# Patient Record
Sex: FEMALE | Race: AMERICAN INDIAN OR ALASKA NATIVE
[De-identification: names, ages, dates, MRNs, and addresses within clinical notes are randomized per-mention and may not be internally consistent; named-entity substitution may affect disease eponyms.]

---

## 2019-09-07 ENCOUNTER — HOSPITAL ENCOUNTER (EMERGENCY)
Dept: HOSPITAL 5 - ED | Age: 24
LOS: 1 days | Discharge: HOME | End: 2019-09-08
Payer: SELF-PAY

## 2019-09-07 DIAGNOSIS — N61.0: Primary | ICD-10-CM

## 2019-09-07 DIAGNOSIS — Z86.2: ICD-10-CM

## 2019-09-07 DIAGNOSIS — Z79.899: ICD-10-CM

## 2019-09-07 LAB
BASOPHILS # (AUTO): 0.1 K/MM3 (ref 0–0.1)
BASOPHILS NFR BLD AUTO: 0.8 % (ref 0–1.8)
BUN SERPL-MCNC: 8 MG/DL (ref 7–17)
BUN/CREAT SERPL: 13 %
CALCIUM SERPL-MCNC: 9 MG/DL (ref 8.4–10.2)
EOSINOPHIL # BLD AUTO: 0.1 K/MM3 (ref 0–0.4)
EOSINOPHIL NFR BLD AUTO: 1 % (ref 0–4.3)
HCT VFR BLD CALC: 33.4 % (ref 30.3–42.9)
HEMOLYSIS INDEX: 12
HGB BLD-MCNC: 10.7 GM/DL (ref 10.1–14.3)
LYMPHOCYTES # BLD AUTO: 1.7 K/MM3 (ref 1.2–5.4)
LYMPHOCYTES NFR BLD AUTO: 23.9 % (ref 13.4–35)
MCHC RBC AUTO-ENTMCNC: 32 % (ref 30–34)
MCV RBC AUTO: 67 FL (ref 79–97)
MONOCYTES # (AUTO): 0.4 K/MM3 (ref 0–0.8)
MONOCYTES % (AUTO): 6 % (ref 0–7.3)
PLATELET # BLD: 261 K/MM3 (ref 140–440)
RBC # BLD AUTO: 4.97 M/MM3 (ref 3.65–5.03)

## 2019-09-07 PROCEDURE — 36415 COLL VENOUS BLD VENIPUNCTURE: CPT

## 2019-09-07 PROCEDURE — 81001 URINALYSIS AUTO W/SCOPE: CPT

## 2019-09-07 PROCEDURE — 96372 THER/PROPH/DIAG INJ SC/IM: CPT

## 2019-09-07 PROCEDURE — 85025 COMPLETE CBC W/AUTO DIFF WBC: CPT

## 2019-09-07 PROCEDURE — 81025 URINE PREGNANCY TEST: CPT

## 2019-09-07 PROCEDURE — 80048 BASIC METABOLIC PNL TOTAL CA: CPT

## 2019-09-07 PROCEDURE — 99283 EMERGENCY DEPT VISIT LOW MDM: CPT

## 2019-09-07 NOTE — EMERGENCY DEPARTMENT REPORT
ED General Adult HPI





- General


Chief complaint: Weakness


Stated complaint: RT BRERST SORE WITH LEAKAGE,FATIGUE


Time Seen by Provider: 09/07/19 22:45


Source: patient


Mode of arrival: Ambulatory


Limitations: No Limitations





- History of Present Illness


Initial comments: 





24-year-old female presents to ED with right breast pain, drainage, but no 

redness.  Symptoms started 2 days ago, and not accompanied by fatigue.  Denies 

any fever, chills or night sweats.  Denies any prior episode in the past.  She 

does have a nuclear ring, and symptoms are mostly surrounding that.





- Related Data


                                  Previous Rx's











 Medication  Instructions  Recorded  Last Taken  Type


 


Sulfamethoxazole/Trimethoprim 1 each PO BID 10 Days #20 tablet 09/08/19 Unknown 

Rx





[Bactrim DS TAB]    











                                    Allergies











Allergy/AdvReac Type Severity Reaction Status Date / Time


 


No Known Allergies Allergy   Unverified 09/07/19 22:04














ED Review of Systems


ROS: 


Stated complaint: RT BRERST SORE WITH LEAKAGE,FATIGUE


Other details as noted in HPI





Comment: All other systems reviewed and negative


ENT: denies: ear pain


Respiratory: denies: cough


Cardiovascular: denies: chest pain, palpitations


Skin: lesions, other


Neurological: weakness.  denies: headache





ED Past Medical Hx





- Past Medical History


Previous Medical History?: Yes


Additional medical history: Anemia





- Surgical History


Past Surgical History?: Yes


Additional Surgical History: C Section





- Family History


Family history: no significant





- Social History


Smoking Status: Never Smoker


Substance Use Type: None





- Medications


Home Medications: 


                                Home Medications











 Medication  Instructions  Recorded  Confirmed  Last Taken  Type


 


Sulfamethoxazole/Trimethoprim 1 each PO BID 10 Days #20 tablet 09/08/19  Unknown

Rx





[Bactrim DS TAB]     














ED Physical Exam





- General


Limitations: No Limitations


General appearance: alert, in no apparent distress





- Head


Head exam: Present: atraumatic, normocephalic





- Eye


Eye exam: Present: normal appearance, PERRL, EOMI


Pupils: Present: normal accommodation





- ENT


ENT exam: Present: normal exam





- Neck


Neck exam: Present: normal inspection





- Respiratory


Respiratory exam: Present: normal lung sounds bilaterally





- Cardiovascular


Cardiovascular Exam: Present: regular rate, normal rhythm





- GI/Abdominal


GI/Abdominal exam: Present: soft, normal bowel sounds





- Extremities Exam


Extremities exam: Present: normal inspection





- Neurological Exam


Neurological exam: Present: alert, oriented X3, CN II-XII intact





- Psychiatric


Psychiatric exam: Present: normal affect





- Skin


Skin exam: Present: other (right breast cellulitis, slight nipple drainage,)





ED Course


                                   Vital Signs











  09/07/19 09/07/19 09/07/19





  22:46 23:30 23:33


 


Temperature 98.5 F 98.3 F 


 


Pulse Rate 79 84 


 


Respiratory 20 16 





Rate   


 


Blood Pressure 146/93  126/78


 


Blood Pressure  126/78 





[Left]   


 


O2 Sat by Pulse 100 98 98





Oximetry   














  09/07/19 09/08/19 09/08/19





  23:46 00:00 00:30


 


Temperature   


 


Pulse Rate   


 


Respiratory   





Rate   


 


Blood Pressure 109/57 126/78 102/59


 


Blood Pressure   





[Left]   


 


O2 Sat by Pulse 99 98 99





Oximetry   














ED Medical Decision Making





- Lab Data


Result diagrams: 


                                 09/07/19 22:41





                                 09/07/19 22:41





- Medical Decision Making


nipple ring was removed, WBC within normal limit, received rocephin im, will d/c

 home.


Critical care attestation.: 


If time is entered above; I have spent that time in minutes in the direct care o

f this critically ill patient, excluding procedure time.








ED Disposition


Clinical Impression: 


 Cellulitis of breast





Disposition: DC-01 TO HOME OR SELFCARE


Is pt being admited?: No


Does the pt Need Aspirin: No


Condition: Stable


Instructions:  Cellulitis (ED)


Prescriptions: 


Sulfamethoxazole/Trimethoprim [Bactrim DS TAB] 1 each PO BID 10 Days #20 tablet


Referrals: 


PRIMARY CARE,MD [Primary Care Provider] - 3-5 Days


Forms:  Work/School Release Form(ED)

## 2019-09-07 NOTE — EVENT NOTE
ED Screening Note


Date of service: 09/07/19


Time: 22:46


ED Screening Note: 





pt cc of lightheadned and fatigue x yaterday


pt also cc of right breast swelling and pain with drainage that began today


LMP:8/19/2019


This initial assessment/diagnostic orders/clinical plan/treatment(s) is/are 

subject to change based on patients health status, clinical progression and re-

assessment by fellow clinical providers in the ED. Further treatment and workup 

at subsequent clinical providers discretion. Patient/guardian urged not to elope

from the ED as their condition may be serious if not clinically assessed and 

managed. 





Initial orders include: 


labs,ua

## 2019-09-08 VITALS — SYSTOLIC BLOOD PRESSURE: 102 MMHG | DIASTOLIC BLOOD PRESSURE: 59 MMHG

## 2019-09-08 LAB
BILIRUB UR QL STRIP: (no result)
BLOOD UR QL VISUAL: (no result)
MUCOUS THREADS #/AREA URNS HPF: (no result) /HPF
PH UR STRIP: 7 [PH] (ref 5–7)
PROT UR STRIP-MCNC: (no result) MG/DL
RBC #/AREA URNS HPF: 4 /HPF (ref 0–6)
UROBILINOGEN UR-MCNC: 2 MG/DL (ref ?–2)
WBC #/AREA URNS HPF: < 1 /HPF (ref 0–6)